# Patient Record
Sex: FEMALE | Race: WHITE
[De-identification: names, ages, dates, MRNs, and addresses within clinical notes are randomized per-mention and may not be internally consistent; named-entity substitution may affect disease eponyms.]

---

## 2022-11-05 ENCOUNTER — HOSPITAL ENCOUNTER (INPATIENT)
Dept: HOSPITAL 12 - ER | Age: 87
LOS: 3 days | Discharge: NURSING, REHAB OR LONG-TERM CARE | DRG: 689 | End: 2022-11-08
Payer: COMMERCIAL

## 2022-11-05 VITALS — WEIGHT: 132 LBS | BODY MASS INDEX: 20 KG/M2 | HEIGHT: 68 IN

## 2022-11-05 DIAGNOSIS — F41.9: ICD-10-CM

## 2022-11-05 DIAGNOSIS — B96.20: ICD-10-CM

## 2022-11-05 DIAGNOSIS — F03.94: ICD-10-CM

## 2022-11-05 DIAGNOSIS — N39.0: Primary | ICD-10-CM

## 2022-11-05 DIAGNOSIS — G93.41: ICD-10-CM

## 2022-11-05 DIAGNOSIS — Z20.822: ICD-10-CM

## 2022-11-05 DIAGNOSIS — F32.A: ICD-10-CM

## 2022-11-05 DIAGNOSIS — E03.9: ICD-10-CM

## 2022-11-05 DIAGNOSIS — K59.00: ICD-10-CM

## 2022-11-05 DIAGNOSIS — E78.5: ICD-10-CM

## 2022-11-05 DIAGNOSIS — Z16.12: ICD-10-CM

## 2022-11-05 DIAGNOSIS — I95.9: ICD-10-CM

## 2022-11-05 DIAGNOSIS — I50.9: ICD-10-CM

## 2022-11-05 DIAGNOSIS — Z79.01: ICD-10-CM

## 2022-11-05 DIAGNOSIS — Z86.711: ICD-10-CM

## 2022-11-05 LAB
ALP SERPL-CCNC: 67 U/L (ref 50–136)
ALT SERPL W/O P-5'-P-CCNC: 18 U/L (ref 14–59)
APPEARANCE UR: (no result)
AST SERPL-CCNC: 27 U/L (ref 15–37)
BILIRUB DIRECT SERPL-MCNC: 0.1 MG/DL (ref 0–0.2)
BILIRUB SERPL-MCNC: 0.5 MG/DL (ref 0.2–1)
BILIRUB UR QL STRIP: NEGATIVE
BUN SERPL-MCNC: 19 MG/DL (ref 7–18)
CHLORIDE SERPL-SCNC: 104 MMOL/L (ref 98–107)
CO2 SERPL-SCNC: 26 MMOL/L (ref 21–32)
COLOR UR: YELLOW
CREAT SERPL-MCNC: 1 MG/DL (ref 0.6–1.3)
GLUCOSE SERPL-MCNC: 163 MG/DL (ref 74–106)
GLUCOSE UR STRIP-MCNC: NEGATIVE MG/DL
HCT VFR BLD AUTO: 34.6 % (ref 31.2–41.9)
KETONES UR STRIP-MCNC: NEGATIVE MG/DL
LEUKOCYTE ESTERASE UR QL STRIP: (no result)
MCH RBC QN AUTO: 31.2 UUG (ref 24.7–32.8)
MCV RBC AUTO: 92 FL (ref 75.5–95.3)
NITRITE UR QL STRIP: POSITIVE
PH UR STRIP: 7 [PH] (ref 5–8)
PLATELET # BLD AUTO: 201 K/UL (ref 179–408)
POTASSIUM SERPL-SCNC: 4.1 MMOL/L (ref 3.5–5.1)
SP GR UR STRIP: 1.02 (ref 1–1.03)
UROBILINOGEN UR STRIP-MCNC: 0.2 E.U./DL
WS STN SPEC: 6.9 G/DL (ref 6.4–8.2)

## 2022-11-05 PROCEDURE — A6209 FOAM DRSG <=16 SQ IN W/O BDR: HCPCS

## 2022-11-05 PROCEDURE — C1758 CATHETER, URETERAL: HCPCS

## 2022-11-05 PROCEDURE — A6213 FOAM DRG >16<=48 SQ IN W/BDR: HCPCS

## 2022-11-05 PROCEDURE — G0378 HOSPITAL OBSERVATION PER HR: HCPCS

## 2022-11-05 PROCEDURE — A4663 DIALYSIS BLOOD PRESSURE CUFF: HCPCS

## 2022-11-06 VITALS — DIASTOLIC BLOOD PRESSURE: 67 MMHG | SYSTOLIC BLOOD PRESSURE: 135 MMHG

## 2022-11-06 VITALS — SYSTOLIC BLOOD PRESSURE: 136 MMHG | DIASTOLIC BLOOD PRESSURE: 71 MMHG

## 2022-11-06 VITALS — SYSTOLIC BLOOD PRESSURE: 127 MMHG | DIASTOLIC BLOOD PRESSURE: 64 MMHG

## 2022-11-06 VITALS — DIASTOLIC BLOOD PRESSURE: 53 MMHG | SYSTOLIC BLOOD PRESSURE: 128 MMHG

## 2022-11-06 LAB
BUN SERPL-MCNC: 16 MG/DL (ref 7–18)
CHLORIDE SERPL-SCNC: 104 MMOL/L (ref 98–107)
CO2 SERPL-SCNC: 25 MMOL/L (ref 21–32)
CREAT SERPL-MCNC: 1.1 MG/DL (ref 0.6–1.3)
DEPRECATED SQUAMOUS URNS QL MICRO: (no result) /HPF
GLUCOSE SERPL-MCNC: 125 MG/DL (ref 74–106)
HCT VFR BLD AUTO: 33.3 % (ref 31.2–41.9)
HGB UR QL STRIP: (no result)
MAGNESIUM SERPL-MCNC: 2.4 MG/DL (ref 1.8–2.4)
MCH RBC QN AUTO: 31.6 UUG (ref 24.7–32.8)
MCV RBC AUTO: 92.8 FL (ref 75.5–95.3)
PHOSPHATE SERPL-MCNC: 2.6 MG/DL (ref 2.5–4.9)
PLATELET # BLD AUTO: 181 K/UL (ref 179–408)
POTASSIUM SERPL-SCNC: 3.9 MMOL/L (ref 3.5–5.1)
RBC #/AREA URNS HPF: (no result) /HPF (ref 0–3)
WBC #/AREA URNS HPF: (no result) /HPF
WBC #/AREA URNS HPF: (no result) /HPF (ref 0–3)

## 2022-11-06 PROCEDURE — B547ZZA ULTRASONOGRAPHY OF LEFT SUBCLAVIAN VEIN, GUIDANCE: ICD-10-PCS

## 2022-11-06 PROCEDURE — 05H633Z INSERTION OF INFUSION DEVICE INTO LEFT SUBCLAVIAN VEIN, PERCUTANEOUS APPROACH: ICD-10-PCS

## 2022-11-06 RX ADMIN — NYSTATIN SCH APPLIC: 100000 CREAM TOPICAL at 18:00

## 2022-11-06 RX ADMIN — RIVAROXABAN SCH MG: 10 TABLET, FILM COATED ORAL at 17:33

## 2022-11-06 RX ADMIN — ENOXAPARIN SODIUM SCH MG: 40 INJECTION SUBCUTANEOUS at 08:44

## 2022-11-06 RX ADMIN — MAGNESIUM SULFATE IN DEXTROSE SCH MLS/HR: 10 INJECTION, SOLUTION INTRAVENOUS at 05:57

## 2022-11-06 RX ADMIN — MIDODRINE HYDROCHLORIDE SCH MG: 2.5 TABLET ORAL at 17:50

## 2022-11-06 RX ADMIN — MAGNESIUM SULFATE IN DEXTROSE SCH MLS/HR: 10 INJECTION, SOLUTION INTRAVENOUS at 04:42

## 2022-11-06 NOTE — NUR
ADmitted pt to room 330; IV placed to R forearm; meds ordered in ER administered; wound 
surveillance done; safety maintained; continue to monitor; continue plan of care.

## 2022-11-07 VITALS — SYSTOLIC BLOOD PRESSURE: 117 MMHG | DIASTOLIC BLOOD PRESSURE: 58 MMHG

## 2022-11-07 VITALS — SYSTOLIC BLOOD PRESSURE: 164 MMHG | DIASTOLIC BLOOD PRESSURE: 70 MMHG

## 2022-11-07 VITALS — DIASTOLIC BLOOD PRESSURE: 57 MMHG | SYSTOLIC BLOOD PRESSURE: 119 MMHG

## 2022-11-07 VITALS — DIASTOLIC BLOOD PRESSURE: 55 MMHG | SYSTOLIC BLOOD PRESSURE: 123 MMHG

## 2022-11-07 LAB
BUN SERPL-MCNC: 16 MG/DL (ref 7–18)
CHLORIDE SERPL-SCNC: 106 MMOL/L (ref 98–107)
CO2 SERPL-SCNC: 24 MMOL/L (ref 21–32)
CREAT SERPL-MCNC: 0.9 MG/DL (ref 0.6–1.3)
GLUCOSE SERPL-MCNC: 98 MG/DL (ref 74–106)
HCT VFR BLD AUTO: 32.6 % (ref 31.2–41.9)
MAGNESIUM SERPL-MCNC: 2.1 MG/DL (ref 1.8–2.4)
MCH RBC QN AUTO: 30.8 UUG (ref 24.7–32.8)
MCV RBC AUTO: 93.6 FL (ref 75.5–95.3)
PHOSPHATE SERPL-MCNC: 3.1 MG/DL (ref 2.5–4.9)
PLATELET # BLD AUTO: 165 K/UL (ref 179–408)
POTASSIUM SERPL-SCNC: 3.7 MMOL/L (ref 3.5–5.1)
TSH SERPL DL<=0.005 MIU/L-ACNC: 3.19 MIU/ML (ref 0.36–3.74)

## 2022-11-07 RX ADMIN — DONEPEZIL HYDROCHLORIDE SCH MG: 10 TABLET, FILM COATED ORAL at 08:38

## 2022-11-07 RX ADMIN — MIDODRINE HYDROCHLORIDE SCH MG: 2.5 TABLET ORAL at 17:00

## 2022-11-07 RX ADMIN — RIVAROXABAN SCH MG: 10 TABLET, FILM COATED ORAL at 17:16

## 2022-11-07 RX ADMIN — MIDODRINE HYDROCHLORIDE SCH MG: 2.5 TABLET ORAL at 08:53

## 2022-11-07 RX ADMIN — Medication SCH MG: at 08:38

## 2022-11-07 RX ADMIN — LEVOTHYROXINE SODIUM SCH MCG: 150 TABLET ORAL at 06:11

## 2022-11-07 RX ADMIN — VITAMIN D, TAB 1000IU (100/BT) SCH UNIT: 25 TAB at 08:45

## 2022-11-07 RX ADMIN — MEMANTINE HYDROCHLORIDE SCH MG: 10 TABLET ORAL at 08:38

## 2022-11-07 RX ADMIN — ESCITALOPRAM OXALATE SCH MG: 10 TABLET, FILM COATED ORAL at 08:45

## 2022-11-07 RX ADMIN — DEXTROSE SCH MLS/HR: 50 INJECTION, SOLUTION INTRAVENOUS at 03:22

## 2022-11-07 RX ADMIN — NYSTATIN SCH APPLIC: 100000 CREAM TOPICAL at 17:36

## 2022-11-07 RX ADMIN — ENOXAPARIN SODIUM SCH MG: 40 INJECTION SUBCUTANEOUS at 08:40

## 2022-11-07 RX ADMIN — FOLIC ACID SCH MG: 1 TABLET ORAL at 08:38

## 2022-11-07 RX ADMIN — NYSTATIN SCH APPLIC: 100000 CREAM TOPICAL at 08:52

## 2022-11-07 NOTE — NUR
Pt. noted to be stable and no acute distress noted during the shift. Before administrating 
Midodrine blood pressure checked and it was 173/65 Midodrine not administered. Pt. was 
asymptomatic. Pt. was monitored and Bp was rechecked in one hour and was noted at 146/65.

## 2022-11-08 VITALS — SYSTOLIC BLOOD PRESSURE: 139 MMHG | DIASTOLIC BLOOD PRESSURE: 52 MMHG

## 2022-11-08 VITALS — DIASTOLIC BLOOD PRESSURE: 68 MMHG | SYSTOLIC BLOOD PRESSURE: 114 MMHG

## 2022-11-08 LAB
BUN SERPL-MCNC: 17 MG/DL (ref 7–18)
CHLORIDE SERPL-SCNC: 107 MMOL/L (ref 98–107)
CO2 SERPL-SCNC: 26 MMOL/L (ref 21–32)
CREAT SERPL-MCNC: 1 MG/DL (ref 0.6–1.3)
GLUCOSE SERPL-MCNC: 91 MG/DL (ref 74–106)
HCT VFR BLD AUTO: 30 % (ref 31.2–41.9)
MAGNESIUM SERPL-MCNC: 2 MG/DL (ref 1.8–2.4)
MCH RBC QN AUTO: 31.8 UUG (ref 24.7–32.8)
MCV RBC AUTO: 93.3 FL (ref 75.5–95.3)
PHOSPHATE SERPL-MCNC: 3.3 MG/DL (ref 2.5–4.9)
PLATELET # BLD AUTO: 173 K/UL (ref 179–408)
POTASSIUM SERPL-SCNC: 3.5 MMOL/L (ref 3.5–5.1)

## 2022-11-08 RX ADMIN — FOLIC ACID SCH MG: 1 TABLET ORAL at 09:12

## 2022-11-08 RX ADMIN — LEVOTHYROXINE SODIUM SCH MCG: 150 TABLET ORAL at 07:00

## 2022-11-08 RX ADMIN — MIDODRINE HYDROCHLORIDE SCH MG: 2.5 TABLET ORAL at 09:11

## 2022-11-08 RX ADMIN — MIDODRINE HYDROCHLORIDE SCH MG: 2.5 TABLET ORAL at 17:00

## 2022-11-08 RX ADMIN — DONEPEZIL HYDROCHLORIDE SCH MG: 10 TABLET, FILM COATED ORAL at 09:12

## 2022-11-08 RX ADMIN — Medication SCH MG: at 09:11

## 2022-11-08 RX ADMIN — NYSTATIN SCH APPLIC: 100000 CREAM TOPICAL at 09:13

## 2022-11-08 RX ADMIN — ENOXAPARIN SODIUM SCH MG: 40 INJECTION SUBCUTANEOUS at 09:13

## 2022-11-08 RX ADMIN — VITAMIN D, TAB 1000IU (100/BT) SCH UNIT: 25 TAB at 09:12

## 2022-11-08 RX ADMIN — NYSTATIN SCH APPLIC: 100000 CREAM TOPICAL at 18:10

## 2022-11-08 RX ADMIN — ESCITALOPRAM OXALATE SCH MG: 10 TABLET, FILM COATED ORAL at 09:12

## 2022-11-08 RX ADMIN — RIVAROXABAN SCH MG: 10 TABLET, FILM COATED ORAL at 18:02

## 2022-11-08 RX ADMIN — MEMANTINE HYDROCHLORIDE SCH MG: 10 TABLET ORAL at 09:19

## 2022-11-08 RX ADMIN — DEXTROSE SCH MLS/HR: 50 INJECTION, SOLUTION INTRAVENOUS at 04:00

## 2022-11-08 NOTE — NUR
2000-0600--PT CONT. WITH STABLE VS. PT HAS BEEN CONFUSED. IV I/P VIA L. ARM M.L. PT DENIES 
PAIN. RESPS ARE REG/UNLAB. PT SLEEPING QUIETLY THROUGH MOST OF THE NIGHT. GEN. COND. HAS 
BEEN STABLE. PT ENDORSED TO DAY SHIFT SYBIL MOFFETT RN

## 2022-11-08 NOTE — NUR
Pt. was discharged to assisted living at 1830 via [facility] and accompanied by 2 transporters. 
Pt. was stable upon discharge and no acute distress noted.

## 2022-11-08 NOTE — NUR
WOUND CARE CONSULT: PT PRESENTS WITH SACRAL SCARRING AND RASH TO RT GROINFOLD, PRESENT ON 
ADMISSION. RECOMMENDATIONS MADE FOR SKIN PROTECTION. DISCUSSED WITH NURSING STAFF. PT IS ON 
FIRST STEP CIRRUS LOW AIRLOSS MATTRESS. MD IN AGREEMENT WITH PLAN OF CARE.

## 2023-01-28 NOTE — NUR
Pt. was monitored during the shift and noted episodes of confusion. She pulled out IV line 
on right hand. 96